# Patient Record
Sex: FEMALE | Race: BLACK OR AFRICAN AMERICAN | NOT HISPANIC OR LATINO | Employment: STUDENT | URBAN - METROPOLITAN AREA
[De-identification: names, ages, dates, MRNs, and addresses within clinical notes are randomized per-mention and may not be internally consistent; named-entity substitution may affect disease eponyms.]

---

## 2017-01-13 ENCOUNTER — LAB VISIT (OUTPATIENT)
Dept: LAB | Facility: HOSPITAL | Age: 32
End: 2017-01-13
Attending: FAMILY MEDICINE
Payer: MEDICAID

## 2017-01-13 ENCOUNTER — OFFICE VISIT (OUTPATIENT)
Dept: FAMILY MEDICINE | Facility: CLINIC | Age: 32
End: 2017-01-13
Payer: MEDICAID

## 2017-01-13 VITALS
TEMPERATURE: 99 F | DIASTOLIC BLOOD PRESSURE: 70 MMHG | BODY MASS INDEX: 22.55 KG/M2 | HEIGHT: 64 IN | OXYGEN SATURATION: 98 % | HEART RATE: 74 BPM | SYSTOLIC BLOOD PRESSURE: 120 MMHG | WEIGHT: 132.06 LBS

## 2017-01-13 DIAGNOSIS — Z02.0 SCHOOL PHYSICAL EXAM: Primary | ICD-10-CM

## 2017-01-13 DIAGNOSIS — F32.A ANXIETY AND DEPRESSION: ICD-10-CM

## 2017-01-13 DIAGNOSIS — Z02.0 SCHOOL PHYSICAL EXAM: ICD-10-CM

## 2017-01-13 DIAGNOSIS — Z23 NEED FOR IMMUNIZATION AGAINST INFLUENZA: ICD-10-CM

## 2017-01-13 DIAGNOSIS — F41.9 ANXIETY AND DEPRESSION: ICD-10-CM

## 2017-01-13 PROCEDURE — 99214 OFFICE O/P EST MOD 30 MIN: CPT | Mod: S$PBB,,, | Performed by: FAMILY MEDICINE

## 2017-01-13 PROCEDURE — 86787 VARICELLA-ZOSTER ANTIBODY: CPT

## 2017-01-13 PROCEDURE — 86706 HEP B SURFACE ANTIBODY: CPT

## 2017-01-13 PROCEDURE — 86735 MUMPS ANTIBODY: CPT

## 2017-01-13 PROCEDURE — 36415 COLL VENOUS BLD VENIPUNCTURE: CPT | Mod: PO

## 2017-01-13 PROCEDURE — 86762 RUBELLA ANTIBODY: CPT

## 2017-01-13 PROCEDURE — 86765 RUBEOLA ANTIBODY: CPT

## 2017-01-13 PROCEDURE — 99999 PR PBB SHADOW E&M-EST. PATIENT-LVL III: CPT | Mod: PBBFAC,,, | Performed by: FAMILY MEDICINE

## 2017-01-13 RX ORDER — FLUOXETINE 20 MG/1
20 TABLET ORAL DAILY
Qty: 30 TABLET | Refills: 5 | Status: SHIPPED | OUTPATIENT
Start: 2017-01-13 | End: 2018-03-06 | Stop reason: SDUPTHER

## 2017-01-13 RX ORDER — IBUPROFEN 800 MG/1
800 TABLET ORAL
COMMUNITY
Start: 2017-01-09

## 2017-01-13 NOTE — PROGRESS NOTES
Chief Complaint   Patient presents with    Follow-up       HPI  Ele Cervantes is a 31 y.o. female with multiple medical diagnoses as listed in the medical history and problem list that presents for follow-up for depression. 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 She has been going to counseling and her mood has improved. The counselor would like to have her  join as there has not been a resolution on what they will do with their marriage.     PAST MEDICAL HISTORY:  Past Medical History   Diagnosis Date    Abnormal Pap smear     Anemia     Anxiety and depression 1/13/2017       PAST SURGICAL HISTORY:  Past Surgical History   Procedure Laterality Date    Mobile tooth extraction      Tonsillectomy, adenoidectomy       At 7 years old       SOCIAL HISTORY:  Social History     Social History    Marital status:      Spouse name: N/A    Number of children: N/A    Years of education: N/A     Occupational History    Not on file.     Social History Main Topics    Smoking status: Never Smoker    Smokeless tobacco: Never Used    Alcohol use No      Comment: socially before pregnancy    Drug use: No    Sexual activity: Yes     Partners: Male     Other Topics Concern    Not on file     Social History Narrative       FAMILY HISTORY:  Family History   Problem Relation Age of Onset    Breast cancer Paternal Grandmother     Cancer Paternal Grandfather     Depression Mother     Hypertension Father     Colon cancer Neg Hx     Ovarian cancer Neg Hx        ALLERGIES AND MEDICATIONS: updated and reviewed.  Review of patient's allergies indicates:   Allergen Reactions    Kiwi (actinidia chinensis) Itching and Swelling    Codeine Nausea And Vomiting     VOMITING       Current Outpatient Prescriptions   Medication Sig Dispense Refill    ACNE MEDICATION 5 % gel APPLY BID.  3    ibuprofen (ADVIL,MOTRIN) 800 MG tablet 800 mg.      MELPAQUE HP 4 % Crea Apply 1 application  "topically 2 (two) times daily. Apply to affected area  2    tretinoin (RETIN-A) 0.05 % cream APPLY TO AFFECTED AREA QD.  3    fluoxetine 20 MG tablet Take 1 tablet (20 mg total) by mouth once daily. 30 tablet 5     No current facility-administered medications for this visit.        ROS  Review of Systems   Constitutional: Negative for chills, diaphoresis, fatigue, fever and unexpected weight change.   HENT: Negative for rhinorrhea, sinus pressure, sore throat and tinnitus.    Eyes: Negative for photophobia and visual disturbance.   Respiratory: Negative for cough, shortness of breath and wheezing.    Cardiovascular: Negative for chest pain and palpitations.   Gastrointestinal: Negative for abdominal pain, blood in stool, constipation, diarrhea, nausea and vomiting.   Genitourinary: Negative for dysuria, flank pain, frequency and vaginal discharge.   Musculoskeletal: Negative for arthralgias and joint swelling.   Skin: Negative for rash.   Neurological: Negative for speech difficulty, weakness, light-headedness and headaches.   Psychiatric/Behavioral: Positive for dysphoric mood. Negative for behavioral problems.       Physical Exam  Vitals:    01/13/17 0732   BP: 120/70   Pulse: 74   Temp: 98.5 °F (36.9 °C)    Body mass index is 22.67 kg/(m^2).  Weight: 59.9 kg (132 lb 0.9 oz)   Height: 5' 4" (162.6 cm)     Physical Exam   Constitutional: She is oriented to person, place, and time. She appears well-developed and well-nourished. No distress.   HENT:   Head: Normocephalic and atraumatic.   Nose: Nose normal.   Mouth/Throat: Oropharynx is clear and moist.   Eyes: EOM are normal.   Neck: Neck supple.   Cardiovascular: Normal rate and regular rhythm.  Exam reveals no gallop and no friction rub.    No murmur heard.  Pulmonary/Chest: Effort normal and breath sounds normal. No respiratory distress. She has no wheezes. She has no rales.   Abdominal: Soft. Bowel sounds are normal. She exhibits no distension and no mass. " There is no tenderness. There is no rebound and no guarding.   Lymphadenopathy:     She has no cervical adenopathy.   Neurological: She is alert and oriented to person, place, and time.   Skin: Skin is warm and dry. No rash noted. No erythema.   Psychiatric: She has a normal mood and affect. Her behavior is normal.   No SI/HI, she seems improved   Nursing note and vitals reviewed.      Health Maintenance       Date Due Completion Date    Pap Smear 6/14/2017 6/14/2016    TETANUS VACCINE 1/20/2024 1/20/2014            ASSESSMENT     1. School physical exam    2. Need for immunization against influenza    3. Anxiety and depression        PLAN:     School physical exam  -     POCT TB Skin Test Read  -     Rubella antibody, IgG; Future; Expected date: 1/13/17  -     Rubeola antibody IgG; Future; Expected date: 1/13/17  -     Mumps, IgG Screen; Future; Expected date: 1/13/17  -     Hepatitis B Surface Antibody, Qual/Quant; Future; Expected date: 1/13/17  -     Varicella zoster antibody, IgG; Future; Expected date: 1/13/17  -     fluoxetine 20 MG tablet; Take 1 tablet (20 mg total) by mouth once daily.  Dispense: 30 tablet; Refill: 5    Need for immunization against influenza    Anxiety and depression    continue counseling, refilled her SSRI  Normal physical exam, paperwork filled out but awaiting titers to determine immunity    Emani Jimenez MD  01/13/2017 1:06 PM        Return if symptoms worsen or fail to improve.

## 2017-01-15 LAB
HEP. B SURF AB, QUAL: POSITIVE
HEP. B SURF AB, QUANT.: 17 MIU/ML

## 2017-01-16 LAB
MUMPS IGG INTERPRETATION: POSITIVE
MUMPS IGG SCREEN: 2.11 ISR
RUBEOLA IGG ANTIBODY: 2.38 ISR
RUBEOLA INTERPRETATION: POSITIVE
RUBV IGG SER-ACNC: 74.4 IU/ML
RUBV IGG SER-IMP: REACTIVE
VARICELLA INTERPRETATION: POSITIVE
VARICELLA ZOSTER IGG: 2.75 ISR

## 2017-01-17 NOTE — PROGRESS NOTES
Results normal please notify pt.that    Her titers are positive, please fill in her paperwork I have already signed it

## 2017-01-18 ENCOUNTER — TELEPHONE (OUTPATIENT)
Dept: FAMILY MEDICINE | Facility: CLINIC | Age: 32
End: 2017-01-18

## 2017-01-18 NOTE — TELEPHONE ENCOUNTER
----- Message from Emani Jimenez MD sent at 1/16/2017  8:19 PM CST -----  Results normal please notify pt.that    Her titers are positive, please fill in her paperwork I have already signed it

## 2017-01-19 ENCOUNTER — TELEPHONE (OUTPATIENT)
Dept: FAMILY MEDICINE | Facility: CLINIC | Age: 32
End: 2017-01-19

## 2017-01-19 NOTE — TELEPHONE ENCOUNTER
----- Message from Lorena Daley sent at 1/19/2017 11:49 AM CST -----  Contact: Self  Pt requesting to speak to nurse regarding hepatis c vaccine.  Please call [phone]

## 2017-03-02 ENCOUNTER — LAB VISIT (OUTPATIENT)
Dept: LAB | Facility: HOSPITAL | Age: 32
End: 2017-03-02
Attending: FAMILY MEDICINE
Payer: MEDICAID

## 2017-03-02 ENCOUNTER — OFFICE VISIT (OUTPATIENT)
Dept: FAMILY MEDICINE | Facility: CLINIC | Age: 32
End: 2017-03-02
Payer: MEDICAID

## 2017-03-02 VITALS
TEMPERATURE: 99 F | BODY MASS INDEX: 23.41 KG/M2 | HEART RATE: 72 BPM | SYSTOLIC BLOOD PRESSURE: 116 MMHG | RESPIRATION RATE: 20 BRPM | DIASTOLIC BLOOD PRESSURE: 68 MMHG | WEIGHT: 137.13 LBS | OXYGEN SATURATION: 99 % | HEIGHT: 64 IN

## 2017-03-02 DIAGNOSIS — R53.83 FATIGUE, UNSPECIFIED TYPE: ICD-10-CM

## 2017-03-02 DIAGNOSIS — R09.81 NASAL CONGESTION: Primary | ICD-10-CM

## 2017-03-02 LAB
BASOPHILS # BLD AUTO: 0.02 K/UL
BASOPHILS NFR BLD: 0.3 %
DIFFERENTIAL METHOD: ABNORMAL
EOSINOPHIL # BLD AUTO: 0.2 K/UL
EOSINOPHIL NFR BLD: 2 %
ERYTHROCYTE [DISTWIDTH] IN BLOOD BY AUTOMATED COUNT: 18.2 %
FERRITIN SERPL-MCNC: 10 NG/ML
HCT VFR BLD AUTO: 33.2 %
HGB BLD-MCNC: 10.3 G/DL
IRON SERPL-MCNC: 21 UG/DL
LYMPHOCYTES # BLD AUTO: 2.7 K/UL
LYMPHOCYTES NFR BLD: 35 %
MCH RBC QN AUTO: 22.9 PG
MCHC RBC AUTO-ENTMCNC: 31 %
MCV RBC AUTO: 74 FL
MONOCYTES # BLD AUTO: 0.4 K/UL
MONOCYTES NFR BLD: 5.5 %
NEUTROPHILS # BLD AUTO: 4.3 K/UL
NEUTROPHILS NFR BLD: 57.2 %
PLATELET # BLD AUTO: 347 K/UL
PMV BLD AUTO: 10.9 FL
RBC # BLD AUTO: 4.49 M/UL
SATURATED IRON: 5 %
TOTAL IRON BINDING CAPACITY: 462 UG/DL
TRANSFERRIN SERPL-MCNC: 312 MG/DL
TSH SERPL DL<=0.005 MIU/L-ACNC: 1.14 UIU/ML
WBC # BLD AUTO: 7.59 K/UL

## 2017-03-02 PROCEDURE — 85025 COMPLETE CBC W/AUTO DIFF WBC: CPT

## 2017-03-02 PROCEDURE — 36415 COLL VENOUS BLD VENIPUNCTURE: CPT

## 2017-03-02 PROCEDURE — 84443 ASSAY THYROID STIM HORMONE: CPT

## 2017-03-02 PROCEDURE — 99999 PR PBB SHADOW E&M-EST. PATIENT-LVL III: CPT | Mod: PBBFAC,,, | Performed by: FAMILY MEDICINE

## 2017-03-02 PROCEDURE — 82306 VITAMIN D 25 HYDROXY: CPT

## 2017-03-02 PROCEDURE — 83540 ASSAY OF IRON: CPT

## 2017-03-02 PROCEDURE — 99213 OFFICE O/P EST LOW 20 MIN: CPT | Mod: S$PBB,,, | Performed by: FAMILY MEDICINE

## 2017-03-02 PROCEDURE — 82728 ASSAY OF FERRITIN: CPT

## 2017-03-02 NOTE — MR AVS SNAPSHOT
[facility]  [address] [facility]  [place] [address]  Phone: [phone]                  Ele Cervantes   3/2/2017 1:20 PM   Office Visit    Description:  Female : 1985   Provider:  Allison Becerril MD   Department:  [facility]           Reason for Visit     Sinus Problem           Diagnoses this Visit        Comments    Nasal congestion    -  Primary     Fatigue, unspecified type                To Do List           Goals (5 Years of Data)     None       These Medications        Disp Refills Start End    phenylephrine HCL 0.5% (DOC-SYNEPHRINE) 0.5 % nasal spray 15 mL 0 3/2/2017 3/5/2017    1 drop by Nasal route every 4 (four) hours as needed for Congestion. - Nasal    Pharmacy: [facility] [id number]  [address] AT [facility] #: [phone]         [facility] On Call     [facility] On Call Nurse Care Line -  Assistance  Registered nurses in the [facility] provide clinical advisement, health education, appointment booking, and other advisory services.  Call for this free service at [phone].             Medications           Message regarding Medications     Verify the changes and/or additions to your medication regime listed below are the same as discussed with your clinician today.  If any of these changes or additions are incorrect, please notify your healthcare provider.        START taking these NEW medications        Refills    phenylephrine HCL 0.5% (DOC-SYNEPHRINE) 0.5 % nasal spray 0    Si drop by Nasal route every 4 (four) hours as needed for Congestion.    Class: Normal    Route: Nasal           Verify that the below list of medications is an accurate representation of the medications you are currently taking.  If none reported, the list may be blank. If incorrect, please contact your healthcare provider. Carry this list with you in case of emergency.           Current Medications      ACNE MEDICATION 5 % gel APPLY BID.    ibuprofen (ADVIL,MOTRIN) 800 MG tablet 800 mg.    tretinoin (RETIN-A) 0.05 % cream APPLY TO AFFECTED AREA QD.    fluoxetine 20 MG tablet Take 1 tablet (20 mg total) by mouth once daily.    MELPAQUE HP 4 % Crea Apply 1 application topically 2 (two) times daily. Apply to affected area    phenylephrine HCL 0.5% (DOC-SYNEPHRINE) 0.5 % nasal spray 1 drop by Nasal route every 4 (four) hours as needed for Congestion.           Clinical Reference Information           Your Vitals Were     BP                   116/68 (BP Location: Right arm, Patient Position: Sitting, BP Method: Manual)           Blood Pressure          Most Recent Value    BP  116/68      Allergies as of 3/2/2017     Kiwi (Actinidia Chinensis)    Codeine      Immunizations Administered on Date of Encounter - 3/2/2017     None      Orders Placed During Today's Visit      Normal Orders This Visit    Ambulatory referral to Allergy     Future Labs/Procedures Expected by Expires    CBC auto differential  3/2/2017 3/2/2018    Ferritin  3/2/2017 3/2/2018    Iron and TIBC  3/2/2017 3/2/2018    TSH  3/2/2017 3/2/2018    Vitamin D  3/2/2017 5/1/2018      [facility] Sign-Up     Activating your [facility] account is as easy as 1-2-3!     1) Visit [email or web address], select Sign Up Now, enter this activation code and your date of birth, then select Next.  [email or web address]  Expires: 4/16/2017  2:06 PM      2) Create a username and password to use when you visit [facility] in the future and select a security question in case you lose your password and select Next.    3) Enter your e-mail address and click Sign Up!    Additional Information  If you have questions, please e-mail [email or web address] or call [phone] to talk to our [facility] staff. Remember, [facility] is NOT to be used for urgent needs. For medical emergencies, dial 911.         Language Assistance Services     ATTENTION: Language assistance services are available, free  of charge. Please call [phone].      ATENCIÓN: Si habla español, tiene a cherry disposición servicios gratuitos de asistencia lingüística. Llame al [phone].     CHÚ Ý: N?u b?n nói Ti?ng Vi?t, có các d?ch v? h? tr? ngôn ng? mi?n phí dành cho b?n. G?i s? [phone].         [facility] complies with applicable Federal civil rights laws and does not discriminate on the basis of race, color, national origin, age, disability, or sex.

## 2017-03-02 NOTE — PROGRESS NOTES
"Subjective:       Ele Cervantes is a 31 y.o. female here for evaluation of a cough. Onset of symptoms was 1 week ago. Symptoms have been unchanged since that time. The cough is productive of green sputum in the mornings, and is aggravated by reclining position. Associated symptoms include: postnasal drip. Patient does not have a history of asthma. Patient does have a history of environmental allergens. Patient has not traveled recently. Patient does not have a history of smoking. Patient has not had a previous chest x-ray. Patient has not had a PPD done. No itchy/watery eyes.  No sneezing.   took claritin but not working.  Tried flonase - no, been using that the last few days.  Never seen an allergist before.  "Normally they give me a zpack and it clears it up."    Alcohol use - 1 glass of wine earlier this week.     Review of Systems  Pertinent items are noted in HPI.      Objective:      Oxygen saturation 98% on room air    /68 (BP Location: Right arm, Patient Position: Sitting, BP Method: Manual)  Pulse 72  Temp 98.5 °F (36.9 °C) (Oral)   Resp 20  Ht 5' 4" (1.626 m)  Wt 62.2 kg (137 lb 2 oz)  LMP 02/28/2017  SpO2 99%  BMI 23.54 kg/m2    General Appearance:    Alert, cooperative, no distress, appears stated age   Head:    Normocephalic, without obvious abnormality, atraumatic   Eyes:    PERRL, conjunctiva/corneas clear, EOM's intact       Nose:   Nares normal, septum midline, mucosa normal, +clear drainage, no sinus tenderness   Throat:   Lips, mucosa, and tongue normal; teeth and gums normal   Neck:   Supple, symmetrical, trachea midline, no adenopathy       Lungs:     Clear to auscultation bilaterally, respirations unlabored        Heart:    Regular rate and rhythm, S1 and S2 normal, no murmur, rub   or gallop                                             Assessment:      Allergic Rhinitis      Plan:      Antitussives per medication orders.  Avoid exposure to tobacco smoke and fumes.  Call if " shortness of breath worsens, blood in sputum, change in character of cough, development of fever or chills, inability to maintain nutrition and hydration. Avoid exposure to tobacco smoke and fumes.  Trial of antihistamines.  Trial of steroid nasal spray.    -  Patient already taking flonase, consider taking Afrin  - rotate zyrtec, claritin, allegra q1 month for now until sees specialist  - allergy consult   Over the counter remedies:   -Increase your water intake to 64-80 oz daily   -Warm salt water gargles with 1/2 cup water and 1 tablespoon salt every 4 hours   -Warm tea with honey and lemon   -Follow up with PCP in 1 week if symptoms do not resolve

## 2017-03-03 ENCOUNTER — TELEPHONE (OUTPATIENT)
Dept: FAMILY MEDICINE | Facility: CLINIC | Age: 32
End: 2017-03-03

## 2017-03-03 DIAGNOSIS — D50.9 IRON DEFICIENCY ANEMIA, UNSPECIFIED IRON DEFICIENCY ANEMIA TYPE: Primary | ICD-10-CM

## 2017-03-03 LAB — 25(OH)D3+25(OH)D2 SERPL-MCNC: 20 NG/ML

## 2017-03-03 RX ORDER — DOCUSATE SODIUM 250 MG
250 CAPSULE ORAL DAILY
Qty: 30 CAPSULE | Refills: 3 | COMMUNITY
Start: 2017-03-03 | End: 2017-03-03 | Stop reason: SDUPTHER

## 2017-03-03 RX ORDER — DOCUSATE SODIUM 250 MG
250 CAPSULE ORAL DAILY
Qty: 30 CAPSULE | Refills: 3 | Status: SHIPPED | OUTPATIENT
Start: 2017-03-03 | End: 2018-04-25 | Stop reason: SDUPTHER

## 2017-03-03 RX ORDER — FERROUS SULFATE 325(65) MG
325 TABLET ORAL
Qty: 30 TABLET | Refills: 5 | Status: SHIPPED | OUTPATIENT
Start: 2017-03-03

## 2017-03-03 NOTE — TELEPHONE ENCOUNTER
Please contact patient and let her know that  - Vitamin D level was mildly low. She should start on weekly Vitamin D3 tablets - 1 tablet once a week. Then OTC Vitamin D3 1000IU per day.  - Her iron was very low too.  She should restart iron and if she is constipated she should take colace with the iron.  She is more anemic than the last time she had her blood checked.      Sincerely  Dr Becerril

## 2017-04-10 ENCOUNTER — OFFICE VISIT (OUTPATIENT)
Dept: FAMILY MEDICINE | Facility: CLINIC | Age: 32
End: 2017-04-10
Payer: MEDICAID

## 2017-04-10 VITALS
SYSTOLIC BLOOD PRESSURE: 102 MMHG | DIASTOLIC BLOOD PRESSURE: 68 MMHG | HEART RATE: 54 BPM | RESPIRATION RATE: 18 BRPM | TEMPERATURE: 98 F | BODY MASS INDEX: 23.64 KG/M2 | WEIGHT: 138.44 LBS | HEIGHT: 64 IN | OXYGEN SATURATION: 99 %

## 2017-04-10 DIAGNOSIS — Z23 REQUIRES HEPATITIS B VACCINATION: ICD-10-CM

## 2017-04-10 DIAGNOSIS — N76.0 BV (BACTERIAL VAGINOSIS): Primary | ICD-10-CM

## 2017-04-10 DIAGNOSIS — B96.89 BV (BACTERIAL VAGINOSIS): Primary | ICD-10-CM

## 2017-04-10 DIAGNOSIS — J30.2 SEASONAL ALLERGIC RHINITIS, UNSPECIFIED ALLERGIC RHINITIS TRIGGER: ICD-10-CM

## 2017-04-10 PROCEDURE — 99213 OFFICE O/P EST LOW 20 MIN: CPT | Mod: PBBFAC,PN | Performed by: FAMILY MEDICINE

## 2017-04-10 PROCEDURE — 90471 IMMUNIZATION ADMIN: CPT | Mod: PBBFAC,PN | Performed by: FAMILY MEDICINE

## 2017-04-10 PROCEDURE — 99214 OFFICE O/P EST MOD 30 MIN: CPT | Mod: S$PBB,,, | Performed by: FAMILY MEDICINE

## 2017-04-10 PROCEDURE — 99999 PR PBB SHADOW E&M-EST. PATIENT-LVL III: CPT | Mod: PBBFAC,,, | Performed by: FAMILY MEDICINE

## 2017-04-10 PROCEDURE — 90746 HEPB VACCINE 3 DOSE ADULT IM: CPT | Mod: PBBFAC,PN | Performed by: FAMILY MEDICINE

## 2017-04-10 RX ORDER — METRONIDAZOLE 7.5 MG/G
1 GEL VAGINAL DAILY
Qty: 70 G | Refills: 0 | Status: SHIPPED | OUTPATIENT
Start: 2017-04-10

## 2017-04-10 RX ORDER — LORATADINE 10 MG/1
10 TABLET ORAL DAILY
Qty: 30 TABLET | Refills: 3 | Status: SHIPPED | OUTPATIENT
Start: 2017-04-10 | End: 2018-04-10

## 2017-04-10 NOTE — MR AVS SNAPSHOT
[facility]  [address] [facility] [address] [facility]  [place] [address]  Phone: [phone]                  Ele Cervantes   4/10/2017 2:00 PM   Office Visit    Description:  Female : 1985   Provider:  Allison Becerril MD   Department:  [facility]           Reason for Visit     Vaginal Itching     Vaginal Odor           Diagnoses this Visit        Comments    BV (bacterial vaginosis)    -  Primary     Seasonal allergic rhinitis, unspecified allergic rhinitis trigger                To Do List           Goals (5 Years of Data)     None       These Medications        Disp Refills Start End    loratadine (CLARITIN) 10 mg tablet 30 tablet 3 4/10/2017 4/10/2018    Take 1 tablet (10 mg total) by mouth once daily. - Oral    Pharmacy: [address] [place], [place] [address] AT [facility] #: [phone]       metronidazole (METROGEL) 0.75 % vaginal gel 70 g 0 4/10/2017     Place 1 applicator vaginally once daily. - Vaginal    Pharmacy: [address] [place] [address] [employer] AT [facility] #: [phone]         [facility] On Call     [facility] On Call Nurse Care Line -  Assistance  Unless otherwise directed by your provider, please contact [facility], our nurse care line that is available for  assistance.     Registered nurses in the [facility] provide: appointment scheduling, clinical advisement, health education, and other advisory services.  Call: [phone] (toll free)               Medications           Message regarding Medications     Verify the changes and/or additions to your medication regime listed below are the same as discussed with your clinician today.  If any of these changes or additions are incorrect, please notify your healthcare provider.        START taking these NEW medications        Refills    loratadine (CLARITIN) 10 mg tablet 3    Sig: Take 1 tablet (10 mg total) by  "mouth once daily.    Class: Normal    Route: Oral    metronidazole (METROGEL) 0.75 % vaginal gel 0    Sig: Place 1 applicator vaginally once daily.    Class: Normal    Route: Vaginal           Verify that the below list of medications is an accurate representation of the medications you are currently taking.  If none reported, the list may be blank. If incorrect, please contact your healthcare provider. Carry this list with you in case of emergency.           Current Medications     ACNE MEDICATION 5 % gel APPLY BID.    docusate sodium (COLACE) 250 MG capsule Take 1 capsule (250 mg total) by mouth once daily.    ferrous sulfate 325 mg (65 mg iron) Tab tablet Take 1 tablet (325 mg total) by mouth daily with breakfast.    ibuprofen (ADVIL,MOTRIN) 800 MG tablet 800 mg.    MELPAQUE HP 4 % Crea Apply 1 application topically 2 (two) times daily. Apply to affected area    tretinoin (RETIN-A) 0.05 % cream APPLY TO AFFECTED AREA QD.    fluoxetine 20 MG tablet Take 1 tablet (20 mg total) by mouth once daily.    loratadine (CLARITIN) 10 mg tablet Take 1 tablet (10 mg total) by mouth once daily.    metronidazole (METROGEL) 0.75 % vaginal gel Place 1 applicator vaginally once daily.           Clinical Reference Information           Your Vitals Were     BP Pulse Temp Resp Height Weight    102/68 (BP Location: Left arm, Patient Position: Sitting, BP Method: Manual) 54 98.4 °F (36.9 °C) (Oral) 18 5' 4" (1.626 m) 62.8 kg (138 lb 7.2 oz)    Last Period SpO2 BMI          03/25/2017 (Approximate) 99% 23.76 kg/m2        Blood Pressure          Most Recent Value    BP  102/68      Allergies as of 4/10/2017     Kiwi (Actinidia Chinensis)    Codeine      Immunizations Administered on Date of Encounter - 4/10/2017     None      Orders Placed During Today's Visit      Normal Orders This Visit    Ambulatory referral to Allergy       [facility] Sign-Up     Activating your [facility] account is as easy as 1-2-3!     1) Visit [email or web address], " select Sign Up Now, enter this activation code and your date of birth, then select Next.  [email or web address]  Expires: 4/16/2017  3:06 PM      2) Create a username and password to use when you visit [facility] in the future and select a security question in case you lose your password and select Next.    3) Enter your e-mail address and click Sign Up!    Additional Information  If you have questions, please e-mail [email or web address] or call [phone] to talk to our [facility] staff. Remember, [facility] is NOT to be used for urgent needs. For medical emergencies, dial 911.         Language Assistance Services     ATTENTION: Language assistance services are available, free of charge. Please call [phone].      ATENCIÓN: Si habla mehran, tiene a cherry disposición servicios gratuitos de asistencia lingüística. Llame al [phone].     CHÚ Ý: N?u b?n nói Ti?ng Vi?t, có các d?ch v? h? tr? ngôn ng? mi?n phí dành cho b?n. G?i s? [phone].         [facility] complies with applicable Federal civil rights laws and does not discriminate on the basis of race, color, national origin, age, disability, or sex.

## 2017-04-10 NOTE — PROGRESS NOTES
"Routine Office Visit    Patient Name: Ele Cervantes    : 1985  MRN: [id number]    Subjective:  Ele is a 31 y.o. female who presents today for:    1.   "I think I have a bacterial infection" -  She has vaginal odor with clothes on and slight itching.  Denies discharge.  Has been going on for 2 days.  LMP  for 5-7 days.  Hygiene - uses [employer].    She has unprotected sex with her boyfriend.  Nothing makes it better or worse.    2.  Seasonal allergies - had issues seeing allergist, would like another referral to same clinic.  Constant rhinorrhea.  Has tried flonase, is currently not on a daily antihistamine.     3.   Hep B - states that her employer took lab work and told her that she was not immune to hep B and required a booster.  She "got stuck" a few days ago.  She states she requires a booster and then will get her repeat labs done to see whether she needs the full 3 shots series again.        Past Medical History  Past Medical History:   Diagnosis Date    Abnormal Pap smear     Anemia     Anxiety and depression 2017       Past Surgical History  Past Surgical History:   Procedure Laterality Date    TONSILLECTOMY, ADENOIDECTOMY      At 7 years old    WISDOM TOOTH EXTRACTION         Family History  Family History   Problem Relation Age of Onset    Breast cancer Paternal Grandmother     Cancer Paternal Grandfather     Depression Mother     Hypertension Father     Colon cancer Neg Hx     Ovarian cancer Neg Hx        Social History  Social History     Social History    Marital status:      Spouse name: N/A    Number of children: N/A    Years of education: N/A     Occupational History    Not on file.     Social History Main Topics    Smoking status: Never Smoker    Smokeless tobacco: Never Used    Alcohol use No      Comment: socially before pregnancy    Drug use: No    Sexual activity: Yes     Partners: Male     Other Topics Concern    Not on file     Social History " "Narrative       Current Medications  Current Outpatient Prescriptions on File Prior to Visit   Medication Sig Dispense Refill    ACNE MEDICATION 5 % gel APPLY BID.  3    docusate sodium (COLACE) 250 MG capsule Take 1 capsule (250 mg total) by mouth once daily. 30 capsule 3    ferrous sulfate 325 mg (65 mg iron) Tab tablet Take 1 tablet (325 mg total) by mouth daily with breakfast. 30 tablet 5    ibuprofen (ADVIL,MOTRIN) 800 MG tablet 800 mg.      MELPAQUE HP 4 % Crea Apply 1 application topically 2 (two) times daily. Apply to affected area  2    tretinoin (RETIN-A) 0.05 % cream APPLY TO AFFECTED AREA QD.  3    fluoxetine 20 MG tablet Take 1 tablet (20 mg total) by mouth once daily. 30 tablet 5     No current facility-administered medications on file prior to visit.        Allergies   Review of patient's allergies indicates:   Allergen Reactions    Kiwi (actinidia chinensis) Itching and Swelling    Codeine Nausea And Vomiting     VOMITING         Review of Systems (Pertinent positives)   Skin: Rash, itching, lesions, color changes   Eyes: Glasses, contacts, vision changes, double vision, blurred vision, pain discharge, excess tearing.  Ears: Earache, ringing in ears, discharge, hearing loss, hearing aid, popping, infection Nose: stuffy nose, mouth breathing, post-nasal drip  Lungs: Cough, sputum, wheeze, frequent URI, SOA, Asthma  Heart: Chest pain, angina, palpitations, extra heart beats  Stomach/Intestine: Heartburn, Nausea, vomiting, diarrhea, indigestion, bloating, constipation, change in bowel movements  Bones/Muscles/Joints: Joint pain, deformities, back pain, swelling, stiffness  Women: Lesions, STD, Painful periods, Heavy periods, irregular periods, sexual problems - dryness, vaginal itching    /68 (BP Location: Left arm, Patient Position: Sitting, BP Method: Manual)  Pulse (!) 54  Temp 98.4 °F (36.9 °C) (Oral)   Resp 18  Ht 5' 4" (1.626 m)  Wt 62.8 kg (138 lb 7.2 oz)  LMP 03/25/2017 " (Approximate)  SpO2 99%  BMI 23.76 kg/m2    GENERAL APPEARANCE: in no apparent distress and well developed and well nourished  RESPIRATORY: appears well, vitals normal, no respiratory distress, acyanotic, normal RR, chest clear, no wheezing, crepitations, rhonchi, normal symmetric air entry  HEART: regular rate and rhythm, S1, S2 normal, no murmur, click, rub or gallop.    NEUROLOGIC: normal without focal findings, CN II-XII are intact.   Extremities: warm/well perfused.  No abnormal hair patterns.  No clubbing, cyanosis or edema.    SKIN: no rashes, no wounds, no other lesions  PSYCH: Alert, oriented x 3, thought content appropriate, speech normal, pleasant and cooperative, good eye contact, well groomed, recall good, concentration/judgement good and apparently average intelligence.    Assessment/Plan:  Ele Cervantes is a 31 y.o. female who presents today for :    Ele was seen today for vaginal itching and vaginal odor.    Diagnoses and all orders for this visit:    BV (bacterial vaginosis)  -     metronidazole (METROGEL) 0.75 % vaginal gel; Place 1 applicator vaginally once daily.  -     Patient was instructed on hygiene, and preventive measures    Seasonal allergic rhinitis, unspecified allergic rhinitis trigger  -     Ambulatory referral to Allergy       -     loratadine (CLARITIN) 10 mg tablet; Take 1 tablet (10 mg total) by mouth once daily.      Requires hepatitis B vaccination  -     Hepatitis B Vaccine (Adult) (IM)  -     Patient will f/u with her employer for repeat labs      F/u PRN if symptoms persist

## 2017-04-10 NOTE — PROGRESS NOTES
Patient received Hep Bvaccine and tolerated it well. Patient received VIS information. Advise to wait 15 min for any possible reactions.

## 2018-01-16 ENCOUNTER — OFFICE VISIT (OUTPATIENT)
Dept: URGENT CARE | Facility: CLINIC | Age: 33
End: 2018-01-16
Payer: MEDICAID

## 2018-01-16 ENCOUNTER — TELEPHONE (OUTPATIENT)
Dept: FAMILY MEDICINE | Facility: CLINIC | Age: 33
End: 2018-01-16

## 2018-01-16 VITALS
BODY MASS INDEX: 23.05 KG/M2 | HEIGHT: 64 IN | OXYGEN SATURATION: 100 % | RESPIRATION RATE: 19 BRPM | HEART RATE: 114 BPM | SYSTOLIC BLOOD PRESSURE: 119 MMHG | TEMPERATURE: 101 F | WEIGHT: 135 LBS | DIASTOLIC BLOOD PRESSURE: 70 MMHG

## 2018-01-16 DIAGNOSIS — J06.9 UPPER RESPIRATORY TRACT INFECTION, UNSPECIFIED TYPE: Primary | ICD-10-CM

## 2018-01-16 LAB
CTP QC/QA: YES
CTP QC/QA: YES
FLUAV AG NPH QL: NEGATIVE
FLUBV AG NPH QL: NEGATIVE
S PYO RRNA THROAT QL PROBE: NEGATIVE

## 2018-01-16 PROCEDURE — 99214 OFFICE O/P EST MOD 30 MIN: CPT | Mod: 25,S$GLB,, | Performed by: FAMILY MEDICINE

## 2018-01-16 PROCEDURE — 87880 STREP A ASSAY W/OPTIC: CPT | Mod: QW,S$GLB,, | Performed by: FAMILY MEDICINE

## 2018-01-16 PROCEDURE — 87804 INFLUENZA ASSAY W/OPTIC: CPT | Mod: QW,S$GLB,, | Performed by: FAMILY MEDICINE

## 2018-01-16 RX ORDER — BENZONATATE 100 MG/1
100 CAPSULE ORAL EVERY 6 HOURS PRN
Qty: 30 CAPSULE | Refills: 1 | Status: SHIPPED | OUTPATIENT
Start: 2018-01-16 | End: 2018-01-24 | Stop reason: SDUPTHER

## 2018-01-16 NOTE — PROGRESS NOTES
"Subjective:       Patient ID: Ele Cervantes is a 32 y.o. female.    Vitals:  height is 5' 4" (1.626 m) and weight is 61.2 kg (135 lb). Her temperature is 100.7 °F (38.2 °C) (abnormal). Her blood pressure is 119/70 and her pulse is 114 (abnormal). Her respiration is 19 and oxygen saturation is 100%.     Chief Complaint: Sore Throat    Sore Throat    This is a new problem. The current episode started yesterday. The problem has been gradually worsening. The maximum temperature recorded prior to her arrival was 100.4 - 100.9 F. The pain is at a severity of 6/10. The pain is moderate. Associated symptoms include congestion, coughing, ear pain, headaches and a hoarse voice. Pertinent negatives include no abdominal pain or shortness of breath. She has tried acetaminophen for the symptoms. The treatment provided no relief.     Review of Systems   Constitution: Positive for fever and malaise/fatigue. Negative for chills.   HENT: Positive for congestion, ear pain, hoarse voice and sore throat.    Eyes: Negative for discharge and redness.   Cardiovascular: Positive for chest pain. Negative for dyspnea on exertion and leg swelling.   Respiratory: Positive for cough. Negative for shortness of breath, sputum production and wheezing.    Musculoskeletal: Positive for myalgias.   Gastrointestinal: Positive for nausea. Negative for abdominal pain.   Neurological: Positive for headaches.       Objective:      Physical Exam   Constitutional: She appears well-developed and well-nourished. No distress.   HENT:   Nose: Mucosal edema and rhinorrhea present.   Mouth/Throat: Posterior oropharyngeal erythema present.   Cardiovascular: Normal rate, regular rhythm and normal heart sounds.    Pulmonary/Chest: Effort normal and breath sounds normal.   Nursing note and vitals reviewed.      Results for orders placed or performed in visit on 01/16/18   POCT Influenza A/B   Result Value Ref Range    Rapid Influenza A Ag Negative Negative    Rapid " Influenza B Ag Negative Negative     Acceptable Yes    POCT rapid strep A   Result Value Ref Range    Rapid Strep A Screen Negative Negative     Acceptable Yes      Assessment:       1. Upper respiratory tract infection, unspecified type        Plan:         Upper respiratory tract infection, unspecified type  -     POCT Influenza A/B  -     POCT rapid strep A  -     benzonatate (TESSALON PERLES) 100 MG capsule; Take 1 capsule (100 mg total) by mouth every 6 (six) hours as needed for Cough.  Dispense: 30 capsule; Refill: 1

## 2018-01-16 NOTE — PATIENT INSTRUCTIONS
Viral Upper Respiratory Illness (Adult)  You have a viral upper respiratory illness (URI), which is another term for the common cold. This illness is contagious during the first few days. It is spread through the air by coughing and sneezing. It may also be spread by direct contact (touching the sick person and then touching your own eyes, nose, or mouth). Frequent handwashing will decrease risk of spread. Most viral illnesses go away within 7 to 10 days with rest and simple home remedies. Sometimes the illness may last for several weeks. Antibiotics will not kill a virus, and they are generally not prescribed for this condition.    Home care  · If symptoms are severe, rest at home for the first 2 to 3 days. When you resume activity, don't let yourself get too tired.  · Avoid being exposed to cigarette smoke (yours or others).  · You may use acetaminophen or ibuprofen to control pain and fever, unless another medicine was prescribed. (Note: If you have chronic liver or kidney disease, have ever had a stomach ulcer or gastrointestinal bleeding, or are taking blood-thinning medicines, talk with your healthcare provider before using these medicines.) Aspirin should never be given to anyone under 18 years of age who is ill with a viral infection or fever. It may cause severe liver or brain damage.  · Your appetite may be poor, so a light diet is fine. Avoid dehydration by drinking 6 to 8 glasses of fluids per day (water, soft drinks, juices, tea, or soup). Extra fluids will help loosen secretions in the nose and lungs.  · Over-the-counter cold medicines will not shorten the length of time youre sick, but they may be helpful for the following symptoms: cough, sore throat, and nasal and sinus congestion. (Note: Do not use decongestants if you have high blood pressure.)  Follow-up care  Follow up with your healthcare provider, or as advised.  When to seek medical advice  Call your healthcare provider right away if any  of these occur:  · Cough with lots of colored sputum (mucus)  · Severe headache; face, neck, or ear pain  · Difficulty swallowing due to throat pain  · Fever of 100.4°F (38°C)  Call 911, or get immediate medical care  Call emergency services right away if any of these occur:  · Chest pain, shortness of breath, wheezing, or difficulty breathing  · Coughing up blood  · Inability to swallow due to throat pain  Date Last Reviewed: 9/13/2015  © 1275-6426 [employer]. [address] [place] [address]. All rights reserved. This information is not intended as a substitute for professional medical care. Always follow your healthcare professional's instructions.

## 2018-01-16 NOTE — TELEPHONE ENCOUNTER
----- Message from Jerry Ashby sent at 1/16/2018  8:23 AM CST -----  Contact: Self/[phone]  [employer]    Patient called stating that she's experiencing bodyaches, chills, cough, nausea, and nasal congestion. Thank you.

## 2018-01-18 ENCOUNTER — TELEPHONE (OUTPATIENT)
Dept: FAMILY MEDICINE | Facility: CLINIC | Age: 33
End: 2018-01-18

## 2018-01-18 NOTE — TELEPHONE ENCOUNTER
----- Message from Mignon Day sent at 1/18/2018  1:56 PM CST -----  Contact: Self   Patient need a something called in for sinus she says she is coughing up a green mucus. Please call at [phone]

## 2018-01-18 NOTE — TELEPHONE ENCOUNTER
Patient requesting a prescription for azithromycin for her symptoms.  She reports being seen at [facility] urgent [facility] on [address] and was told that she was     - flu and -strep but has begun to cough up thick, green mucus.  Informed that her last OV with Dr. Jimenez was 1/2017 and that an appointment will be required to obtain a prescription.  Verbalized understanding and scheduled appointment for 1/24/2018.  Advised patient to return to the urgent care on [address] or at this clinic this weekend if her condition worsens and she cannot wait until her appointment.  Verbalized understanding.

## 2018-01-24 ENCOUNTER — OFFICE VISIT (OUTPATIENT)
Dept: FAMILY MEDICINE | Facility: CLINIC | Age: 33
End: 2018-01-24
Payer: MEDICAID

## 2018-01-24 ENCOUNTER — TELEPHONE (OUTPATIENT)
Dept: FAMILY MEDICINE | Facility: CLINIC | Age: 33
End: 2018-01-24

## 2018-01-24 VITALS
HEIGHT: 64 IN | WEIGHT: 136.69 LBS | DIASTOLIC BLOOD PRESSURE: 68 MMHG | HEART RATE: 72 BPM | OXYGEN SATURATION: 72 % | BODY MASS INDEX: 23.34 KG/M2 | SYSTOLIC BLOOD PRESSURE: 102 MMHG | TEMPERATURE: 98 F

## 2018-01-24 DIAGNOSIS — J01.90 ACUTE BACTERIAL SINUSITIS: Primary | ICD-10-CM

## 2018-01-24 DIAGNOSIS — R11.0 NAUSEA: ICD-10-CM

## 2018-01-24 DIAGNOSIS — B96.89 ACUTE BACTERIAL SINUSITIS: Primary | ICD-10-CM

## 2018-01-24 DIAGNOSIS — R05.9 COUGH: ICD-10-CM

## 2018-01-24 PROCEDURE — 99999 PR PBB SHADOW E&M-EST. PATIENT-LVL III: CPT | Mod: PBBFAC,,, | Performed by: FAMILY MEDICINE

## 2018-01-24 PROCEDURE — 99213 OFFICE O/P EST LOW 20 MIN: CPT | Mod: PBBFAC,PO | Performed by: FAMILY MEDICINE

## 2018-01-24 PROCEDURE — 99213 OFFICE O/P EST LOW 20 MIN: CPT | Mod: S$PBB,,, | Performed by: FAMILY MEDICINE

## 2018-01-24 RX ORDER — AZITHROMYCIN 250 MG/1
TABLET, FILM COATED ORAL
Qty: 6 TABLET | Refills: 0 | Status: SHIPPED | OUTPATIENT
Start: 2018-01-24 | End: 2018-01-29

## 2018-01-24 RX ORDER — DEXAMETHASONE SODIUM PHOSPHATE 4 MG/ML
8 INJECTION, SOLUTION INTRA-ARTICULAR; INTRALESIONAL; INTRAMUSCULAR; INTRAVENOUS; SOFT TISSUE
Status: COMPLETED | OUTPATIENT
Start: 2018-01-24 | End: 2018-01-24

## 2018-01-24 RX ORDER — ONDANSETRON 8 MG/1
8 TABLET, ORALLY DISINTEGRATING ORAL EVERY 6 HOURS PRN
Qty: 25 TABLET | Refills: 1 | Status: SHIPPED | OUTPATIENT
Start: 2018-01-24

## 2018-01-24 RX ORDER — BENZONATATE 100 MG/1
200 CAPSULE ORAL EVERY 6 HOURS PRN
Qty: 60 CAPSULE | Refills: 1 | Status: SHIPPED | OUTPATIENT
Start: 2018-01-24 | End: 2019-01-24

## 2018-01-24 RX ORDER — PROMETHAZINE HYDROCHLORIDE AND DEXTROMETHORPHAN HYDROBROMIDE 6.25; 15 MG/5ML; MG/5ML
5 SYRUP ORAL EVERY 12 HOURS PRN
Qty: 180 ML | Refills: 0 | Status: SHIPPED | OUTPATIENT
Start: 2018-01-24 | End: 2018-02-03

## 2018-01-24 RX ADMIN — DEXAMETHASONE SODIUM PHOSPHATE 8 MG: 4 INJECTION, SOLUTION INTRAMUSCULAR; INTRAVENOUS at 03:01

## 2018-01-24 NOTE — PROGRESS NOTES
Chief Complaint   Patient presents with    Sinus Problem    Cough       HPI  Ele Cervantes is a 32 y.o. female with multiple medical diagnoses as listed in the medical history and problem list that presents for evaluation for one week of nasal drainage that is green and thick and an uncontrollable cough. She was seen in urgent care and had a fever 100.7. She tested negative for strep and flu.     PAST MEDICAL HISTORY:  Past Medical History:   Diagnosis Date    Abnormal Pap smear     Anemia     Anxiety and depression 1/13/2017       PAST SURGICAL HISTORY:  Past Surgical History:   Procedure Laterality Date    TONSILLECTOMY, ADENOIDECTOMY      At 7 years old    WISDOM TOOTH EXTRACTION         SOCIAL HISTORY:  Social History     Social History    Marital status:      Spouse name: N/A    Number of children: N/A    Years of education: N/A     Occupational History    Not on file.     Social History Main Topics    Smoking status: Never Smoker    Smokeless tobacco: Never Used    Alcohol use No      Comment: socially before pregnancy    Drug use: No    Sexual activity: Yes     Partners: Male     Other Topics Concern    Not on file     Social History Narrative    No narrative on file       FAMILY HISTORY:  Family History   Problem Relation Age of Onset    Breast cancer Paternal Grandmother     Cancer Paternal Grandfather     Depression Mother     Hypertension Father     Colon cancer Neg Hx     Ovarian cancer Neg Hx        ALLERGIES AND MEDICATIONS: updated and reviewed.  Review of patient's allergies indicates:   Allergen Reactions    Kiwi (actinidia chinensis) Itching and Swelling    Codeine Nausea And Vomiting     VOMITING       Current Outpatient Prescriptions   Medication Sig Dispense Refill    ACNE MEDICATION 5 % gel APPLY BID.  3    benzonatate (TESSALON PERLES) 100 MG capsule Take 2 capsules (200 mg total) by mouth every 6 (six) hours as needed for Cough. 60 capsule 1    docusate  sodium (COLACE) 250 MG capsule Take 1 capsule (250 mg total) by mouth once daily. 30 capsule 3    ferrous sulfate 325 mg (65 mg iron) Tab tablet Take 1 tablet (325 mg total) by mouth daily with breakfast. 30 tablet 5    ibuprofen (ADVIL,MOTRIN) 800 MG tablet 800 mg.      loratadine (CLARITIN) 10 mg tablet Take 1 tablet (10 mg total) by mouth once daily. 30 tablet 3    MELPAQUE HP 4 % Crea Apply 1 application topically 2 (two) times daily. Apply to affected area  2    metronidazole (METROGEL) 0.75 % vaginal gel Place 1 applicator vaginally once daily. 70 g 0    tretinoin (RETIN-A) 0.05 % cream APPLY TO AFFECTED AREA QD.  3    azithromycin (Z-NEGIN) 250 MG tablet Take 2 tablets by mouth on day 1; Take 1 tablet by mouth on days 2-5 6 tablet 0    fluoxetine 20 MG tablet Take 1 tablet (20 mg total) by mouth once daily. 30 tablet 5    ondansetron (ZOFRAN-ODT) 8 MG TbDL Take 1 tablet (8 mg total) by mouth every 6 (six) hours as needed. 25 tablet 1    promethazine-dextromethorphan (PROMETHAZINE-DM) 6.25-15 mg/5 mL Syrp Take 5 mLs by mouth every 12 (twelve) hours as needed. 180 mL 0     No current facility-administered medications for this visit.        ROS  Review of Systems   Constitutional: Negative for chills, diaphoresis, fatigue, fever and unexpected weight change.   HENT: Positive for congestion, postnasal drip, rhinorrhea and sinus pressure. Negative for sore throat and tinnitus.    Eyes: Negative for photophobia and visual disturbance.   Respiratory: Positive for cough. Negative for shortness of breath and wheezing.    Cardiovascular: Negative for chest pain and palpitations.   Gastrointestinal: Positive for nausea. Negative for abdominal pain, blood in stool, constipation, diarrhea and vomiting.   Genitourinary: Negative for dysuria, flank pain, frequency and vaginal discharge.   Musculoskeletal: Negative for arthralgias and joint swelling.   Skin: Negative for rash.   Neurological: Negative for speech  "difficulty, weakness, light-headedness and headaches.   Psychiatric/Behavioral: Negative for behavioral problems and dysphoric mood.       Physical Exam  Vitals:    01/24/18 1422   BP: 102/68   BP Location: Left arm   Patient Position: Sitting   BP Method: Small (Manual)   Pulse: 72   Temp: 98.2 °F (36.8 °C)   TempSrc: Oral   SpO2: (!) 72%   Weight: 62 kg (136 lb 11 oz)   Height: 5' 4" (1.626 m)    Body mass index is 23.46 kg/m².  Weight: 62 kg (136 lb 11 oz)   Height: 5' 4" (162.6 cm)     Physical Exam   Constitutional: She is oriented to person, place, and time. She appears well-developed and well-nourished. No distress.   She is constantly coughing but not hypoxic or using accessory muscles to breath, sat is poor due to coughing   HENT:   Head: Normocephalic and atraumatic.   Mouth/Throat: No oropharyngeal exudate.   Oropharyngeal erythema, bilateral TMs WNL    Right frontal sinus pain   Eyes: EOM are normal.   Neck: Neck supple.   Cardiovascular: Normal rate and regular rhythm.  Exam reveals no gallop and no friction rub.    No murmur heard.  Pulmonary/Chest: Effort normal and breath sounds normal. No respiratory distress. She has no wheezes. She has no rales.   Lymphadenopathy:     She has no cervical adenopathy.   Neurological: She is alert and oriented to person, place, and time.   Skin: Skin is warm and dry. No rash noted. No erythema.   Psychiatric: She has a normal mood and affect. Her behavior is normal.   Nursing note and vitals reviewed.      Health Maintenance       Date Due Completion Date    Influenza Vaccine 08/01/2017 11/30/2016    Pap Smear 11/08/2018 11/8/2017    TETANUS VACCINE 01/20/2024 1/20/2014            ASSESSMENT     1. Acute bacterial sinusitis    2. Cough    3. Nausea        PLAN:     Problem List Items Addressed This Visit     None      Visit Diagnoses     Acute bacterial sinusitis    -  Primary  -coverage for atypical as cough is constant    Relevant Medications    benzonatate " (TESSALON PERLES) 100 MG capsule    azithromycin (Z-NEGIN) 250 MG tablet    Cough        Relevant Medications    promethazine-dextromethorphan (PROMETHAZINE-DM) 6.25-15 mg/5 mL Syrp    dexamethasone injection 8 mg (Completed)    azithromycin (Z-NEGIN) 250 MG tablet    Nausea        Relevant Medications    ondansetron (ZOFRAN-ODT) 8 MG TbDL        She has also been given time off from work, recommend she rest and take tylenol for body ache and fever    Emani Jimenez MD  01/24/2018 2:48 PM        Follow-up if symptoms worsen or fail to improve.

## 2018-01-24 NOTE — LETTER
January 24, 2018      [facility]  [address] [facility]  [place] [address]  Phone: [phone]  Fax: [phone]       Patient: Ele Cervantes   YOB: 1985  Date of Visit: 01/24/2018    To Whom It May Concern:    Jose Raul Cervantes  was at [facility] on 01/24/2018. She may return to work/school on 01/29/2018 with no restrictions. If you have any questions or concerns, or if I can be of further assistance, please do not hesitate to contact me.    Sincerely,          Emani Jimenez MD

## 2018-01-24 NOTE — TELEPHONE ENCOUNTER
----- Message from Alin Russell sent at 1/24/2018  3:29 PM CST -----  Contact: Olesya  Called to clarify directions on benzonatate (TESSALON PERLES) 100 MG capsule. Olesay can be reached @ [phone].

## 2018-03-06 DIAGNOSIS — Z02.0 SCHOOL PHYSICAL EXAM: ICD-10-CM

## 2018-03-07 RX ORDER — FLUOXETINE 20 MG/1
TABLET ORAL
Qty: 30 TABLET | Refills: 5 | Status: SHIPPED | OUTPATIENT
Start: 2018-03-07

## 2018-04-20 ENCOUNTER — HOSPITAL ENCOUNTER (EMERGENCY)
Facility: HOSPITAL | Age: 33
Discharge: HOME | End: 2018-04-20
Payer: OTHER MISCELLANEOUS

## 2018-04-20 VITALS
RESPIRATION RATE: 20 BRPM | OXYGEN SATURATION: 100 % | DIASTOLIC BLOOD PRESSURE: 65 MMHG | HEIGHT: 64 IN | HEART RATE: 80 BPM | SYSTOLIC BLOOD PRESSURE: 119 MMHG | BODY MASS INDEX: 22.2 KG/M2 | WEIGHT: 130 LBS | TEMPERATURE: 99 F

## 2018-04-20 DIAGNOSIS — S61.412A LACERATION OF LEFT HAND WITHOUT FOREIGN BODY, INITIAL ENCOUNTER: Primary | ICD-10-CM

## 2018-04-20 LAB
B-HCG UR QL: NEGATIVE
CTP QC/QA: YES

## 2018-04-20 PROCEDURE — 81025 URINE PREGNANCY TEST: CPT | Performed by: EMERGENCY MEDICINE

## 2018-04-20 PROCEDURE — 12001 RPR S/N/AX/GEN/TRNK 2.5CM/<: CPT

## 2018-04-20 PROCEDURE — 99283 EMERGENCY DEPT VISIT LOW MDM: CPT

## 2018-04-20 NOTE — ED PROVIDER NOTES
Encounter Date: 4/20/2018       History     Chief Complaint   Patient presents with    Laceration     laceration to left hand while doing surgery upstairs     27 y/o female presents to the ED with a laceration to her left hand. 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 The container was contaminated with a patient's urine. She immediately washed and irrigated her hand in the OR, and also applied H2O2. She has minor pain. She denies restricted ROM, numbenss, or active bleeding.           Review of patient's allergies indicates:   Allergen Reactions    Kiwi (actinidia chinensis) Itching and Swelling    Codeine Nausea And Vomiting     VOMITING       Past Medical History:   Diagnosis Date    Abnormal Pap smear     Anemia     Anxiety and depression 1/13/2017     Past Surgical History:   Procedure Laterality Date    TONSILLECTOMY, ADENOIDECTOMY      At 7 years old    WISDOM TOOTH EXTRACTION       Family History   Problem Relation Age of Onset    Breast cancer Paternal Grandmother     Cancer Paternal Grandfather     Depression Mother     Hypertension Father     Colon cancer Neg Hx     Ovarian cancer Neg Hx      Social History   Substance Use Topics    Smoking status: Never Smoker    Smokeless tobacco: Never Used    Alcohol use No      Comment: occassionally     Review of Systems   Constitutional: Negative for fever.   HENT: Negative for sore throat.    Respiratory: Negative for shortness of breath.    Cardiovascular: Negative for chest pain.   Gastrointestinal: Negative for nausea.   Genitourinary: Negative for dysuria.   Musculoskeletal: Negative for back pain.   Skin: Positive for wound. Negative for rash.   Neurological: Negative for weakness.   Hematological: Does not bruise/bleed easily.       Physical Exam     Initial Vitals [04/20/18 1256]   BP Pulse Resp Temp SpO2   119/65 80 20 98.5 °F (36.9 °C) 100 %      MAP       83          Physical Exam    Vitals reviewed.  Constitutional: She appears well-developed and well-nourished. She is not diaphoretic. No distress.   HENT:   Head: Normocephalic and atraumatic.   Right Ear: External ear normal.   Left Ear: External ear normal.   Nose: Nose normal.   Eyes: Conjunctivae are normal. No scleral icterus.   Neck: Normal range of motion. Neck supple.   Cardiovascular: Normal rate, regular rhythm and intact distal pulses.   Pulmonary/Chest: No respiratory distress.   Musculoskeletal: Normal range of motion.   Neurological: She is alert and oriented to person, place, and time.   Skin: Skin is warm and dry.   Small mostly superficial avulsion type lac to palmar left thumb at the MCP joint ~0.3cm total length.          ED Course   Procedures  Labs Reviewed   POCT URINE PREGNANCY             Medical Decision Making:   Initial Assessment:   33 y/o female with minor lac to left thumb from urine-contaminated broken glass while working in operating room today. She has small avulsion laceration without evidence of significant neurovascular or tendon injury. She explains she immediately irrigated and cleaned the wound extensively and adequately. She has minor pain and declined pain medication. Tetanus is UTD.  ED Management:  Lac repaired with skin glue. No obvious risks for infection or further injury. Pt given wound care instructions and discharged in stable condition.                       Clinical Impression:   The encounter diagnosis was Laceration of left hand without foreign body, initial encounter.                           Brock Matias PA-C  04/20/18 [id number]

## 2018-04-25 DIAGNOSIS — D50.9 IRON DEFICIENCY ANEMIA, UNSPECIFIED IRON DEFICIENCY ANEMIA TYPE: ICD-10-CM

## 2018-04-25 RX ORDER — DOCUSATE SODIUM 250 MG
250 CAPSULE ORAL DAILY
Qty: 30 CAPSULE | Refills: 0 | Status: SHIPPED | OUTPATIENT
Start: 2018-04-25

## 2018-09-12 ENCOUNTER — APPOINTMENT (RX ONLY)
Dept: URBAN - METROPOLITAN AREA CLINIC 98 | Facility: CLINIC | Age: 33
Setting detail: DERMATOLOGY
End: 2018-09-12

## 2018-09-12 DIAGNOSIS — L70.5 ACNÉ EXCORIÉE: ICD-10-CM

## 2018-09-12 DIAGNOSIS — L03.03 CELLULITIS OF TOE: ICD-10-CM

## 2018-09-12 PROBLEM — L03.032 CELLULITIS OF LEFT TOE: Status: ACTIVE | Noted: 2018-09-12

## 2018-09-12 PROBLEM — F41.9 ANXIETY DISORDER, UNSPECIFIED: Status: ACTIVE | Noted: 2018-09-12

## 2018-09-12 PROCEDURE — ? PRESCRIPTION

## 2018-09-12 PROCEDURE — 99203 OFFICE O/P NEW LOW 30 MIN: CPT

## 2018-09-12 PROCEDURE — ? TREATMENT REGIMEN

## 2018-09-12 PROCEDURE — ? COUNSELING

## 2018-09-12 RX ORDER — ADAPALENE 1 MG/G
GEL TOPICAL
Qty: 1 | Refills: 4 | Status: ERX | COMMUNITY
Start: 2018-09-12

## 2018-09-12 RX ORDER — SPIRONOLACTONE 100 MG/1
TABLET, FILM COATED ORAL
Qty: 30 | Refills: 3 | Status: ERX | COMMUNITY
Start: 2018-09-12

## 2018-09-12 RX ORDER — DOXYCYCLINE HYCLATE 150 MG/1
TABLET, DELAYED RELEASE ORAL
Qty: 30 | Refills: 1 | Status: ERX | COMMUNITY
Start: 2018-09-12

## 2018-09-12 RX ORDER — DOXYCYCLINE HYCLATE 100 MG/1
CAPSULE, GELATIN COATED ORAL
Qty: 14 | Refills: 0 | Status: ERX | COMMUNITY
Start: 2018-09-12

## 2018-09-12 RX ADMIN — SPIRONOLACTONE: 100 TABLET, FILM COATED ORAL at 20:25

## 2018-09-12 RX ADMIN — DOXYCYCLINE HYCLATE: 150 TABLET, DELAYED RELEASE ORAL at 20:24

## 2018-09-12 RX ADMIN — ADAPALENE: 1 GEL TOPICAL at 20:26

## 2018-09-12 RX ADMIN — DOXYCYCLINE HYCLATE: 100 CAPSULE, GELATIN COATED ORAL at 20:37

## 2018-09-12 ASSESSMENT — LOCATION SIMPLE DESCRIPTION DERM
LOCATION SIMPLE: LEFT CHEEK
LOCATION SIMPLE: LEFT GREAT TOE

## 2018-09-12 ASSESSMENT — LOCATION DETAILED DESCRIPTION DERM
LOCATION DETAILED: LEFT INFERIOR CENTRAL MALAR CHEEK
LOCATION DETAILED: LEFT DISTAL PLANTAR GREAT TOE

## 2018-09-12 ASSESSMENT — LOCATION ZONE DERM
LOCATION ZONE: FACE
LOCATION ZONE: TOE

## 2018-09-12 ASSESSMENT — SEVERITY ASSESSMENT OVERALL AMONG ALL PATIENTS
IN YOUR EXPERIENCE, AMONG ALL PATIENTS YOU HAVE SEEN WITH THIS CONDITION, HOW SEVERE IS THIS PATIENT'S CONDITION?: INFLAMMATORY LESIONS MORE APPARENT; MANY COMEDONES AND PAPULES/PUSTULES, +/- FEW NODULOCYSTIC LESIONS

## 2018-09-12 NOTE — PROCEDURE: TREATMENT REGIMEN
Samples Given: Veltin (tretinoin/clinda) gel to use on face QHS while waiting on adapalene to arrive.\\nBPO 2% cleanser
Initiate Treatment: Spironolactone 100mg QD \\nDoxycycline 150mg ER QD with food x1-2months\\nAdapalene 0.1% gel Q1-3HS increase as tolerated
Detail Level: Zone
Otc Regimen: Benzoyl peroxide 4-5% cleanser QAM \\nSalicylic acid cleanser QPM
Initiate Treatment: Doxycycline 100mg BID x7 days (Pt's 150mg delayed-release acne dose won't arrive in the mail for at least a week anyway)

## 2021-08-06 ENCOUNTER — OFFICE VISIT (OUTPATIENT)
Dept: URGENT CARE | Facility: CLINIC | Age: 36
End: 2021-08-06
Payer: COMMERCIAL

## 2021-08-06 VITALS
OXYGEN SATURATION: 97 % | BODY MASS INDEX: 23.9 KG/M2 | HEART RATE: 90 BPM | RESPIRATION RATE: 18 BRPM | DIASTOLIC BLOOD PRESSURE: 73 MMHG | SYSTOLIC BLOOD PRESSURE: 118 MMHG | TEMPERATURE: 100 F | HEIGHT: 64 IN | WEIGHT: 140 LBS

## 2021-08-06 DIAGNOSIS — U07.1 COVID-19: Primary | ICD-10-CM

## 2021-08-06 LAB
CTP QC/QA: YES
SARS-COV-2 RDRP RESP QL NAA+PROBE: POSITIVE

## 2021-08-06 PROCEDURE — 3008F PR BODY MASS INDEX (BMI) DOCUMENTED: ICD-10-PCS | Mod: CPTII,S$GLB,, | Performed by: PHYSICIAN ASSISTANT

## 2021-08-06 PROCEDURE — 3008F BODY MASS INDEX DOCD: CPT | Mod: CPTII,S$GLB,, | Performed by: PHYSICIAN ASSISTANT

## 2021-08-06 PROCEDURE — 1160F PR REVIEW ALL MEDS BY PRESCRIBER/CLIN PHARMACIST DOCUMENTED: ICD-10-PCS | Mod: CPTII,S$GLB,, | Performed by: PHYSICIAN ASSISTANT

## 2021-08-06 PROCEDURE — 1160F RVW MEDS BY RX/DR IN RCRD: CPT | Mod: CPTII,S$GLB,, | Performed by: PHYSICIAN ASSISTANT

## 2021-08-06 PROCEDURE — 99203 PR OFFICE/OUTPT VISIT, NEW, LEVL III, 30-44 MIN: ICD-10-PCS | Mod: S$GLB,,, | Performed by: PHYSICIAN ASSISTANT

## 2021-08-06 PROCEDURE — 3074F PR MOST RECENT SYSTOLIC BLOOD PRESSURE < 130 MM HG: ICD-10-PCS | Mod: CPTII,S$GLB,, | Performed by: PHYSICIAN ASSISTANT

## 2021-08-06 PROCEDURE — 3074F SYST BP LT 130 MM HG: CPT | Mod: CPTII,S$GLB,, | Performed by: PHYSICIAN ASSISTANT

## 2021-08-06 PROCEDURE — U0002 COVID-19 LAB TEST NON-CDC: HCPCS | Mod: QW,S$GLB,, | Performed by: PHYSICIAN ASSISTANT

## 2021-08-06 PROCEDURE — 1125F PR PAIN SEVERITY QUANTIFIED, PAIN PRESENT: ICD-10-PCS | Mod: CPTII,S$GLB,, | Performed by: PHYSICIAN ASSISTANT

## 2021-08-06 PROCEDURE — 1159F MED LIST DOCD IN RCRD: CPT | Mod: CPTII,S$GLB,, | Performed by: PHYSICIAN ASSISTANT

## 2021-08-06 PROCEDURE — 99203 OFFICE O/P NEW LOW 30 MIN: CPT | Mod: S$GLB,,, | Performed by: PHYSICIAN ASSISTANT

## 2021-08-06 PROCEDURE — 3078F DIAST BP <80 MM HG: CPT | Mod: CPTII,S$GLB,, | Performed by: PHYSICIAN ASSISTANT

## 2021-08-06 PROCEDURE — U0002: ICD-10-PCS | Mod: QW,S$GLB,, | Performed by: PHYSICIAN ASSISTANT

## 2021-08-06 PROCEDURE — 1159F PR MEDICATION LIST DOCUMENTED IN MEDICAL RECORD: ICD-10-PCS | Mod: CPTII,S$GLB,, | Performed by: PHYSICIAN ASSISTANT

## 2021-08-06 PROCEDURE — 1125F AMNT PAIN NOTED PAIN PRSNT: CPT | Mod: CPTII,S$GLB,, | Performed by: PHYSICIAN ASSISTANT

## 2021-08-06 PROCEDURE — 3078F PR MOST RECENT DIASTOLIC BLOOD PRESSURE < 80 MM HG: ICD-10-PCS | Mod: CPTII,S$GLB,, | Performed by: PHYSICIAN ASSISTANT

## 2021-08-06 RX ORDER — ALBUTEROL SULFATE 90 UG/1
2 AEROSOL, METERED RESPIRATORY (INHALATION) EVERY 6 HOURS PRN
Qty: 18 G | Refills: 0 | Status: SHIPPED | OUTPATIENT
Start: 2021-08-06 | End: 2022-08-06

## 2021-08-06 RX ORDER — IBUPROFEN 200 MG
400 TABLET ORAL
Status: COMPLETED | OUTPATIENT
Start: 2021-08-06 | End: 2021-08-06

## 2021-08-06 RX ADMIN — Medication 400 MG: at 05:08
